# Patient Record
Sex: FEMALE | Race: ASIAN | NOT HISPANIC OR LATINO | Employment: UNEMPLOYED
[De-identification: names, ages, dates, MRNs, and addresses within clinical notes are randomized per-mention and may not be internally consistent; named-entity substitution may affect disease eponyms.]

---

## 2021-02-05 ENCOUNTER — TRANSCRIBE ORDERS (OUTPATIENT)
Dept: SCHEDULING | Age: 46
End: 2021-02-05

## 2021-02-05 DIAGNOSIS — R35.0 FREQUENCY OF MICTURITION: ICD-10-CM

## 2021-02-05 DIAGNOSIS — Z12.39 ENCOUNTER FOR OTHER SCREENING FOR MALIGNANT NEOPLASM OF BREAST: Primary | ICD-10-CM

## 2021-02-05 DIAGNOSIS — R33.9 RETENTION OF URINE, UNSPECIFIED: ICD-10-CM

## 2021-02-08 ENCOUNTER — HOSPITAL ENCOUNTER (OUTPATIENT)
Dept: RADIOLOGY | Facility: CLINIC | Age: 46
Discharge: HOME | End: 2021-02-08
Attending: FAMILY MEDICINE
Payer: COMMERCIAL

## 2021-02-08 DIAGNOSIS — R35.0 FREQUENCY OF MICTURITION: ICD-10-CM

## 2021-02-08 DIAGNOSIS — Z12.39 ENCOUNTER FOR OTHER SCREENING FOR MALIGNANT NEOPLASM OF BREAST: ICD-10-CM

## 2021-02-08 DIAGNOSIS — R33.9 RETENTION OF URINE, UNSPECIFIED: ICD-10-CM

## 2021-02-08 PROCEDURE — 76770 US EXAM ABDO BACK WALL COMP: CPT

## 2021-02-08 PROCEDURE — 77063 BREAST TOMOSYNTHESIS BI: CPT

## 2022-09-07 ENCOUNTER — APPOINTMENT (RX ONLY)
Dept: URBAN - METROPOLITAN AREA CLINIC 374 | Facility: CLINIC | Age: 47
Setting detail: DERMATOLOGY
End: 2022-09-07

## 2022-09-07 DIAGNOSIS — L23.7 ALLERGIC CONTACT DERMATITIS DUE TO PLANTS, EXCEPT FOOD: ICD-10-CM

## 2022-09-07 PROCEDURE — ? PRESCRIPTION

## 2022-09-07 PROCEDURE — ? PRESCRIPTION MEDICATION MANAGEMENT

## 2022-09-07 PROCEDURE — 99203 OFFICE O/P NEW LOW 30 MIN: CPT

## 2022-09-07 PROCEDURE — ? COUNSELING

## 2022-09-07 RX ORDER — CLOBETASOL PROPIONATE 0.5 MG/G
OINTMENT TOPICAL BID
Qty: 60 | Refills: 1 | Status: ERX | COMMUNITY
Start: 2022-09-07

## 2022-09-07 RX ADMIN — CLOBETASOL PROPIONATE: 0.5 OINTMENT TOPICAL at 00:00

## 2022-09-07 ASSESSMENT — LOCATION SIMPLE DESCRIPTION DERM
LOCATION SIMPLE: RIGHT FOREARM
LOCATION SIMPLE: LEFT FOREARM
LOCATION SIMPLE: RIGHT UPPER ARM
LOCATION SIMPLE: LEFT UPPER ARM

## 2022-09-07 ASSESSMENT — LOCATION DETAILED DESCRIPTION DERM
LOCATION DETAILED: RIGHT ANTERIOR PROXIMAL UPPER ARM
LOCATION DETAILED: RIGHT VENTRAL PROXIMAL FOREARM
LOCATION DETAILED: LEFT VENTRAL PROXIMAL FOREARM
LOCATION DETAILED: LEFT ANTERIOR PROXIMAL UPPER ARM

## 2022-09-07 ASSESSMENT — LOCATION ZONE DERM: LOCATION ZONE: ARM

## 2022-09-07 NOTE — PROCEDURE: PRESCRIPTION MEDICATION MANAGEMENT
Initiate Treatment: clobetasol 0.05 % topical ointment: Apply to AA of rash BID to TID until clear then DC
Continue Regimen: Prednisone 10mg tablet: take as directed and prescribed by initial physician\\nZyrtec 10mg: Take as directed and prescribed by initial physician
Detail Level: Zone
Render In Strict Bullet Format?: No

## 2022-12-06 ENCOUNTER — TRANSCRIBE ORDERS (OUTPATIENT)
Dept: SCHEDULING | Age: 47
End: 2022-12-06

## 2022-12-06 DIAGNOSIS — N20.0 CALCULUS OF KIDNEY: Primary | ICD-10-CM

## 2022-12-07 ENCOUNTER — HOSPITAL ENCOUNTER (OUTPATIENT)
Dept: RADIOLOGY | Age: 47
Discharge: HOME | End: 2022-12-07
Attending: UROLOGY
Payer: COMMERCIAL

## 2022-12-07 DIAGNOSIS — N20.0 CALCULUS OF KIDNEY: ICD-10-CM

## 2022-12-07 PROCEDURE — 74176 CT ABD & PELVIS W/O CONTRAST: CPT

## 2025-04-24 ENCOUNTER — OFFICE VISIT (OUTPATIENT)
Dept: URGENT CARE | Facility: CLINIC | Age: 50
End: 2025-04-24
Payer: COMMERCIAL

## 2025-04-24 VITALS
RESPIRATION RATE: 16 BRPM | HEART RATE: 79 BPM | BODY MASS INDEX: 20.01 KG/M2 | TEMPERATURE: 98 F | DIASTOLIC BLOOD PRESSURE: 76 MMHG | OXYGEN SATURATION: 98 % | SYSTOLIC BLOOD PRESSURE: 118 MMHG | WEIGHT: 135.13 LBS | HEIGHT: 69 IN

## 2025-04-24 DIAGNOSIS — S01.511A LACERATION OF LOWER LIP, INITIAL ENCOUNTER: Primary | ICD-10-CM

## 2025-04-24 DIAGNOSIS — M54.2 NECK PAIN: ICD-10-CM

## 2025-04-24 DIAGNOSIS — N30.01 ACUTE CYSTITIS WITH HEMATURIA: ICD-10-CM

## 2025-04-24 DIAGNOSIS — B37.9 ANTIBIOTIC-INDUCED YEAST INFECTION: ICD-10-CM

## 2025-04-24 DIAGNOSIS — S00.511A: ICD-10-CM

## 2025-04-24 DIAGNOSIS — T36.95XA ANTIBIOTIC-INDUCED YEAST INFECTION: ICD-10-CM

## 2025-04-24 LAB
BILIRUBIN, UA POC OHS: ABNORMAL
BLOOD, UA POC OHS: ABNORMAL
CLARITY, UA POC OHS: ABNORMAL
COLOR, UA POC OHS: YELLOW
GLUCOSE, UA POC OHS: NEGATIVE
KETONES, UA POC OHS: >=160
LEUKOCYTES, UA POC OHS: ABNORMAL
NITRITE, UA POC OHS: NEGATIVE
PH, UA POC OHS: 6
PROTEIN, UA POC OHS: 30
SPECIFIC GRAVITY, UA POC OHS: 1.02
UROBILINOGEN, UA POC OHS: 0.2

## 2025-04-24 RX ORDER — AMOXICILLIN AND CLAVULANATE POTASSIUM 875; 125 MG/1; MG/1
1 TABLET, FILM COATED ORAL EVERY 12 HOURS
Qty: 10 TABLET | Refills: 0 | Status: SHIPPED | OUTPATIENT
Start: 2025-04-24 | End: 2025-04-24

## 2025-04-24 RX ORDER — FLUCONAZOLE 150 MG/1
150 TABLET ORAL DAILY
Qty: 2 TABLET | Refills: 0 | Status: SHIPPED | OUTPATIENT
Start: 2025-04-24 | End: 2025-04-26

## 2025-04-24 RX ORDER — IBUPROFEN 600 MG/1
600 TABLET ORAL 3 TIMES DAILY PRN
Qty: 15 TABLET | Refills: 0 | Status: SHIPPED | OUTPATIENT
Start: 2025-04-24 | End: 2025-04-29

## 2025-04-24 RX ORDER — MUPIROCIN 20 MG/G
OINTMENT TOPICAL 3 TIMES DAILY PRN
Qty: 22 G | Refills: 0 | Status: SHIPPED | OUTPATIENT
Start: 2025-04-24

## 2025-04-24 RX ORDER — VIBEGRON 75 MG/1
75 TABLET, FILM COATED ORAL
COMMUNITY

## 2025-04-24 RX ORDER — ALPRAZOLAM 0.25 MG/1
TABLET ORAL 3 TIMES DAILY
COMMUNITY

## 2025-04-24 RX ORDER — AMOXICILLIN AND CLAVULANATE POTASSIUM 875; 125 MG/1; MG/1
1 TABLET, FILM COATED ORAL EVERY 12 HOURS
Qty: 14 TABLET | Refills: 0 | Status: SHIPPED | OUTPATIENT
Start: 2025-04-24 | End: 2025-05-01

## 2025-04-24 NOTE — PATIENT INSTRUCTIONS
[facility] Instructions    Signs of infection include:  Increase in redness, increase in pain, purulent (pus) drainage. Contact clinic if infection concerns arise.   Do not apply a great deal of tension or stress to the suture line.  Keep covered when you are out and about, if the dressing becomes wet, change it immediately. Keep open to air when at home.     Please return here or go to the Emergency Department for any concerns or worsening of condition.  You were prescribed antibiotics, please take them to completion.  Please follow up with your primary care doctor or specialist as needed.    If you  smoke, please stop smoking.     UTI  - Drink plenty of fluids, wipe front to back, take showers not baths, no scented soaps, wear breathable cotton underwear, urinate after sexual intercourse.   - Tylenol or Ibuprofen as directed as needed for pain.    - If you were prescribed antibiotics, please take them to completion. Please supplement with OTC probiotics and yogurt.   -You must understand that you've received an Urgent Care treatment only and that you may be released before all your medical problems are known or treated. You, the patient, will arrange for follow up care as instructed. Please arrange follow up with your primary medical clinic within 2-5 days if your signs and symptoms have not resolved or worsen. Please go to the ER for worsening symptoms including worsening fever, flank pain, vomiting, unable to tolerate fluids, fatigue, etc.   - Follow up with your PCP or specialty clinic as directed in next 2-5 days for re-evaluation.  You can call [phone] to schedule an appointment with the appropriate provider.    - If your condition worsens or fails to improve we recommend that you receive another evaluation at the emergency room immediately or contact your primary medical clinic to discuss your concerns.      RED FLAGS/WARNING SYMPTOMS DISCUSSED WITH PATIENT THAT WOULD WARRANT EMERGENT  MEDICAL ATTENTION. Patient aware and verbalized understanding.

## 2025-04-24 NOTE — PROGRESS NOTES
"Subjective:      Patient ID: Moustapha Aldana is a 49 y.o. female.    Vitals:  height is 5' 9" (1.753 m) and weight is 61.3 kg (135 lb 1.6 oz). Her tympanic temperature is 98.1 °F (36.7 °C). Her blood pressure is 118/76 and her pulse is 79. Her respiration is 16 and oxygen saturation is 98%.     Chief Complaint: Lip Laceration    49 y.o female presents with a bottom lip laceration onset this AM[8:45]  She reports that she got up fast, was light-headed and dizzy. She believes that she passed out in the process. She believes that she hit her head.  She reports neck pain as well.    49 year old female presents to clinic with reports of lower lip laceration approximately 4 hours prior to clinic arrival after a fall. Complaints of neck pain and     Laceration   The incident occurred 3 to 6 hours ago. The laceration is located on the Face. The laceration is 2 cm in size. The laceration mechanism is unknown.The pain is at a severity of 2/10. The patient is experiencing no pain. The pain has been Constant since onset.       Constitution: Positive for activity change and appetite change.   Musculoskeletal:  Positive for pain and trauma.   Skin:  Positive for laceration and puncture wound.      Objective:     Physical Exam    Assessment:     1. Laceration of lower lip, initial encounter        Plan:       Laceration of lower lip, initial encounter  -     Laceration Repair      Laceration Repair    Date/Time: 4/24/2025 12:15 PM    Performed by: Lucy Carpenter NP  Authorized by: Lucy Carpenter NP  Body area: head/neck  Location details: lower lip  Full thickness lip laceration: yes  Vermilion border involved: no  Lip laceration height: vermillion only  Laceration length: 1 cm  Foreign bodies: no foreign bodies  Tendon involvement: none  Nerve involvement: none  Vascular damage: no  Anesthesia: local infiltration    Anesthesia:  Local Anesthetic: lidocaine 1% without epinephrine  Anesthetic total: 0.5 mL    Patient sedated: " no  Irrigation solution: saline  Irrigation method: syringe and tap  Amount of cleaning: standard  Debridement: none  Degree of undermining: none  Number of sutures: 3  Technique: simple  Approximation: close  Approximation difficulty: simple  Patient tolerance: Patient tolerated the procedure well with no immediate complications

## 2025-04-24 NOTE — PROGRESS NOTES
"Subjective:      Patient ID: Moustapha Aldana is a 49 y.o. female.    Vitals:  height is 5' 9" (1.753 m) and weight is 61.3 kg (135 lb 1.6 oz). Her tympanic temperature is 98.1 °F (36.7 °C). Her blood pressure is 118/76 and her pulse is 79. Her respiration is 16 and oxygen saturation is 98%.     Chief Complaint: Lip Laceration and Urinary Tract Infection    49 y.o. female presents with UTI symptoms  Starting approx 3 days ago[Monday 4/21].. Reports  burning and an order when urinating.  Denies any vaginal discharge/    49-year-old female presents to clinic with reports of lower lip laceration after falling on the street. Discussed recommendation for closure >2 cm. States due to depth requesting closure with sutures after goggling A1 and noting its recommendation. Reports history of recurrent UTI with noticeable burning with urination and urine odor after recent SA.    Urinary Tract Infection   This is a new problem. The current episode started in the past 7 days. The problem occurs every urination. The problem has been gradually worsening. The quality of the pain is described as burning. The pain is at a severity of 2/10. The pain is mild. She is Sexually active. There is No history of pyelonephritis. Associated symptoms include frequency and hesitancy. Pertinent negatives include no chills, flank pain or urgency. She has tried nothing for the symptoms. The treatment provided no relief. Her past medical history is significant for recurrent UTIs.       Constitution: Negative for chills and fever.   HENT:  Positive for facial trauma.         Lower lip laceration   Neck: Positive for neck pain.   Genitourinary:  Positive for dysuria and frequency. Negative for urgency, urine decreased, flank pain, vaginal pain, vaginal discharge and vaginal odor.   Musculoskeletal:  Negative for back pain.   Skin:  Positive for laceration. Negative for erythema.      Objective:     Physical Exam   Constitutional: She is oriented to person, " place, and time. She appears well-developed.   HENT:   Head: Normocephalic and atraumatic. Head is without abrasion, without contusion and without laceration.   Ears:   Right Ear: External ear normal.   Left Ear: External ear normal.   Nose: Nose normal.   Mouth/Throat: Oropharynx is clear and moist and mucous membranes are normal. Lacerations present.   Eyes: EOM and lids are normal. Extraocular movement intact   Neck: Trachea normal and phonation normal. Neck supple.   Cardiovascular: Normal rate, regular rhythm and normal heart sounds.   Pulmonary/Chest: Effort normal and breath sounds normal. No stridor. No respiratory distress.   Abdominal: Soft. flat abdomen There is no abdominal tenderness. There is no left CVA tenderness and no right CVA tenderness.   Musculoskeletal: Normal range of motion.         General: Normal range of motion.   Neurological: She is alert and oriented to person, place, and time.   Skin: Skin is warm, dry, intact and no rash. Capillary refill takes less than 2 seconds. No abrasion, No burn, No bruising, No erythema and No ecchymosis   Psychiatric: Her speech is normal and behavior is normal. Judgment and thought content normal.   Nursing note and vitals reviewed.            Assessment:     1. Laceration of lower lip, initial encounter    2. Abrasion of vermilion border of upper lip, initial encounter    3. Neck pain    4. Antibiotic-induced yeast infection    5. Acute cystitis with hematuria      Results for orders placed or performed in visit on 04/24/25   POCT Urinalysis(Instrument)    Collection Time: 04/24/25  2:05 PM   Result Value Ref Range    Color, POC UA Yellow Yellow, Straw, Colorless    Clarity, POC UA Slight Cloudy (A) Clear    Glucose, POC UA Negative Negative    Bilirubin, POC UA Small (A) Negative    Ketones, POC UA >=160 (A) Negative    Spec Grav POC UA 1.025 1.005 - 1.030    Blood, POC UA Small (A) Negative    pH, POC UA 6.0 5.0 - 8.0    Protein, POC UA 30 (A) Negative     Urobilinogen, POC UA 0.2 <=1.0    Nitrite, POC UA Negative Negative    WBC, POC UA Large (A) Negative      Plan:       Laceration of lower lip, initial encounter  -     Laceration Repair  -     Discontinue: amoxicillin-clavulanate 875-125mg (AUGMENTIN) 875-125 mg per tablet; Take 1 tablet by mouth every 12 (twelve) hours. for 5 days  Dispense: 10 tablet; Refill: 0  -     amoxicillin-clavulanate 875-125mg (AUGMENTIN) 875-125 mg per tablet; Take 1 tablet by mouth every 12 (twelve) hours. for 7 days  Dispense: 14 tablet; Refill: 0    Abrasion of vermilion border of upper lip, initial encounter  -     mupirocin (BACTROBAN) 2 % ointment; Apply topically 3 (three) times daily as needed.  Dispense: 22 g; Refill: 0    Neck pain  -     ibuprofen (ADVIL,MOTRIN) 600 MG tablet; Take 1 tablet (600 mg total) by mouth 3 (three) times daily as needed for Pain.  Dispense: 15 tablet; Refill: 0    Antibiotic-induced yeast infection  -     fluconazole (DIFLUCAN) 150 MG Tab; Take 1 tablet (150 mg total) by mouth once daily. May repeat dose x 1 three days after the first dose for 2 days  Dispense: 2 tablet; Refill: 0  -     POCT Urinalysis(Instrument)    Acute cystitis with hematuria  -     amoxicillin-clavulanate 875-125mg (AUGMENTIN) 875-125 mg per tablet; Take 1 tablet by mouth every 12 (twelve) hours. for 7 days  Dispense: 14 tablet; Refill: 0      [facility]aceration Repair    Date/Time: 4/24/2025 12:15 PM    Performed by: Lucy Carpenter NP  Authorized by: Lucy Carpenter NP  Body area: head/neck  Location details: lower lip  Full thickness lip laceration: yes  Vermilion border involved: no  Lip laceration height: vermillion only  Laceration length: 1 cm  Foreign bodies: no foreign bodies  Tendon involvement: none  Nerve involvement: none  Vascular damage: no  Anesthesia: local infiltration    Anesthesia:  Local Anesthetic: lidocaine 1% without epinephrine  Anesthetic total: 0.5 mL    Patient sedated: no  Irrigation solution:  saline  Irrigation method: syringe and tap  Amount of cleaning: standard  Debridement: none  Degree of undermining: none  Number of sutures: 3  Technique: simple  Approximation: close  Approximation difficulty: simple  Patient tolerance: Patient tolerated the procedure well with no immediate complications         Patient Instructions   [facility] Instructions    Signs of infection include:  Increase in redness, increase in pain, purulent (pus) drainage. Contact clinic if infection concerns arise.   Do not apply a great deal of tension or stress to the suture line.  Keep covered when you are out and about, if the dressing becomes wet, change it immediately. Keep open to air when at home.     Please return here or go to the Emergency Department for any concerns or worsening of condition.  You were prescribed antibiotics, please take them to completion.  Please follow up with your primary care doctor or specialist as needed.    If you  smoke, please stop smoking.     UTI  - Drink plenty of fluids, wipe front to back, take showers not baths, no scented soaps, wear breathable cotton underwear, urinate after sexual intercourse.   - Tylenol or Ibuprofen as directed as needed for pain.    - If you were prescribed antibiotics, please take them to completion. Please supplement with OTC probiotics and yogurt.   -You must understand that you've received an Urgent Care treatment only and that you may be released before all your medical problems are known or treated. You, the patient, will arrange for follow up care as instructed. Please arrange follow up with your primary medical clinic within 2-5 days if your signs and symptoms have not resolved or worsen. Please go to the ER for worsening symptoms including worsening fever, flank pain, vomiting, unable to tolerate fluids, fatigue, etc.   - Follow up with your PCP or specialty clinic as directed in next 2-5 days for re-evaluation.  You can call [phone] to  schedule an appointment with the appropriate provider.    - If your condition worsens or fails to improve we recommend that you receive another evaluation at the emergency room immediately or contact your primary medical clinic to discuss your concerns.      RED FLAGS/WARNING SYMPTOMS DISCUSSED WITH PATIENT THAT WOULD WARRANT EMERGENT MEDICAL ATTENTION. Patient aware and verbalized understanding.

## 2025-04-24 NOTE — PROCEDURES
Laceration Repair    Date/Time: 4/24/2025 12:15 PM    Performed by: Lucy Carpenter NP  Authorized by: Lucy Carpenter NP  Body area: head/neck  Location details: lower lip  Full thickness lip laceration: yes  Vermilion border involved: no  Lip laceration height: vermillion only  Laceration length: 1 cm  Foreign bodies: no foreign bodies  Tendon involvement: none  Nerve involvement: none  Vascular damage: no  Anesthesia: local infiltration    Anesthesia:  Local Anesthetic: lidocaine 1% without epinephrine  Anesthetic total: 0.5 mL    Patient sedated: no  Irrigation solution: saline  Irrigation method: syringe and tap  Amount of cleaning: standard  Debridement: none  Degree of undermining: none  Number of sutures: 3  Technique: simple  Approximation: close  Approximation difficulty: simple  Patient tolerance: Patient tolerated the procedure well with no immediate complications